# Patient Record
Sex: MALE | URBAN - METROPOLITAN AREA
[De-identification: names, ages, dates, MRNs, and addresses within clinical notes are randomized per-mention and may not be internally consistent; named-entity substitution may affect disease eponyms.]

---

## 2019-08-06 ENCOUNTER — OFFICE VISIT (OUTPATIENT)
Dept: URBAN - METROPOLITAN AREA CLINIC 11 | Facility: CLINIC | Age: 39
End: 2019-08-06
Payer: COMMERCIAL

## 2019-08-06 DIAGNOSIS — E11.9 TYPE 2 DIABETES MELLITUS W/O COMPLICATION: Primary | ICD-10-CM

## 2019-08-06 DIAGNOSIS — H16.223 KERATOCONJUNCTIVITIS SICCA, NOT SPECIFIED AS SJÖGREN'S, BILATERAL: ICD-10-CM

## 2019-08-06 PROCEDURE — 92004 COMPRE OPH EXAM NEW PT 1/>: CPT | Performed by: OPTOMETRIST

## 2019-08-06 PROCEDURE — 92133 CPTRZD OPH DX IMG PST SGM ON: CPT | Performed by: OPTOMETRIST

## 2019-08-06 RX ORDER — CYCLOSPORINE 0.5 MG/ML
0.05 % EMULSION OPHTHALMIC
Qty: 60 | Refills: 11 | Status: INACTIVE
Start: 2019-08-06 | End: 2019-08-12

## 2019-08-06 RX ORDER — LOTEPREDNOL ETABONATE 3.8 MG/G
0.38 % GEL OPHTHALMIC
Qty: 1 | Refills: 2 | Status: INACTIVE
Start: 2019-08-06 | End: 2021-01-25

## 2019-08-06 ASSESSMENT — INTRAOCULAR PRESSURE
OD: 18
OS: 18

## 2019-08-06 NOTE — IMPRESSION/PLAN
Impression: Type 2 diabetes mellitus w/o complication: G18.8. Plan: No signs of retinopathy or neovascularization noted. Discussed ocular and systemic benefits of blood sugar control.  RTC 1yr complete exam

## 2019-08-06 NOTE — IMPRESSION/PLAN
Impression: Open angle with borderline findings, high risk, bilateral: H40.023. Plan: OCT of RNFL ordered and performed today. RNFL mild thinning OS.  Monitor without drops for now. schedule VF 24-2.
f/u 1mn IOP VF

## 2019-09-18 ENCOUNTER — OFFICE VISIT (OUTPATIENT)
Dept: URBAN - METROPOLITAN AREA CLINIC 11 | Facility: CLINIC | Age: 39
End: 2019-09-18
Payer: COMMERCIAL

## 2019-09-18 DIAGNOSIS — H40.023 OPEN ANGLE WITH BORDERLINE FINDINGS, HIGH RISK, BILATERAL: Primary | ICD-10-CM

## 2019-09-18 PROCEDURE — 92012 INTRM OPH EXAM EST PATIENT: CPT | Performed by: OPTOMETRIST

## 2019-09-18 PROCEDURE — 92083 EXTENDED VISUAL FIELD XM: CPT | Performed by: OPTOMETRIST

## 2019-09-18 ASSESSMENT — INTRAOCULAR PRESSURE
OS: 22
OD: 20

## 2019-09-18 NOTE — IMPRESSION/PLAN
Impression: Open angle with borderline findings, high risk, bilateral: H40.023. OCT 8/19 9/9 94/87; VF 9/19 OD mild SA, OS Full Plan: VF ordered and performed today. VF shows defects OD. Repeat VF in 4-6mns. IOP slightly elevated today ou.  Monitor without drops for now. schedule VF 24-2.
f/u 4-6mns IOP VF

## 2021-01-25 ENCOUNTER — OFFICE VISIT (OUTPATIENT)
Dept: URBAN - METROPOLITAN AREA CLINIC 11 | Facility: CLINIC | Age: 41
End: 2021-01-25
Payer: COMMERCIAL

## 2021-01-25 DIAGNOSIS — H52.223 REGULAR ASTIGMATISM, BILATERAL: ICD-10-CM

## 2021-01-25 PROCEDURE — 92133 CPTRZD OPH DX IMG PST SGM ON: CPT | Performed by: OPTOMETRIST

## 2021-01-25 PROCEDURE — 99214 OFFICE O/P EST MOD 30 MIN: CPT | Performed by: OPTOMETRIST

## 2021-01-25 ASSESSMENT — INTRAOCULAR PRESSURE
OD: 19
OS: 19

## 2021-01-25 ASSESSMENT — VISUAL ACUITY
OS: 20/20
OD: 20/20

## 2021-01-25 NOTE — IMPRESSION/PLAN
Impression: Open angle with borderline findings, high risk, bilateral: H40.023. Plan: OCT of RNFL ordered and performed today. RNFL mild thinning OU. Monitor without drops for now.

## 2021-01-25 NOTE — IMPRESSION/PLAN
Impression: Type 2 diabetes mellitus w/o complication: X63.8. Plan: No signs of retinopathy or neovascularization noted. Discussed ocular and systemic benefits of blood sugar control.  RTC 1yr complete exam

## 2022-08-29 ENCOUNTER — OFFICE VISIT (OUTPATIENT)
Facility: LOCATION | Age: 42
End: 2022-08-29
Payer: COMMERCIAL

## 2022-08-29 DIAGNOSIS — E11.9 TYPE 2 DIABETES MELLITUS WITHOUT COMPLICATIONS: ICD-10-CM

## 2022-08-29 DIAGNOSIS — H40.023 OPEN ANGLE WITH BORDERLINE FINDINGS, HIGH RISK, BILATERAL: Primary | ICD-10-CM

## 2022-08-29 PROCEDURE — 99204 OFFICE O/P NEW MOD 45 MIN: CPT | Performed by: OPTOMETRIST

## 2022-08-29 PROCEDURE — 92134 CPTRZ OPH DX IMG PST SGM RTA: CPT | Performed by: OPTOMETRIST

## 2022-08-29 PROCEDURE — 92133 CPTRZD OPH DX IMG PST SGM ON: CPT | Performed by: OPTOMETRIST

## 2022-08-29 ASSESSMENT — VISUAL ACUITY
OD: 20/25
OS: 20/20

## 2022-08-29 ASSESSMENT — KERATOMETRY
OD: 42.63
OS: 42.13

## 2022-08-29 ASSESSMENT — INTRAOCULAR PRESSURE
OD: 18
OS: 18

## 2022-08-29 NOTE — IMPRESSION/PLAN
Impression: Open angle with borderline findings, high risk, bilateral: H40.023. Plan: Patient advised of stable condition and IOP stable without medication Will continue to monitor intraocular pressure. No treatment is being implemented at this time. 
RTC 1 year DFE , RNFL OU

## 2023-08-25 ENCOUNTER — OFFICE VISIT (OUTPATIENT)
Facility: LOCATION | Age: 43
End: 2023-08-25
Payer: COMMERCIAL

## 2023-08-25 DIAGNOSIS — H40.023 OPEN ANGLE WITH BORDERLINE FINDINGS, HIGH RISK, BILATERAL: ICD-10-CM

## 2023-08-25 DIAGNOSIS — H04.123 TEAR FILM INSUFFICIENCY OF BILATERAL LACRIMAL GLANDS: ICD-10-CM

## 2023-08-25 DIAGNOSIS — E11.9 TYPE 2 DIABETES MELLITUS WITHOUT COMPLICATIONS: Primary | ICD-10-CM

## 2023-08-25 PROCEDURE — 92133 CPTRZD OPH DX IMG PST SGM ON: CPT | Performed by: OPTOMETRIST

## 2023-08-25 PROCEDURE — 92014 COMPRE OPH EXAM EST PT 1/>: CPT | Performed by: OPTOMETRIST

## 2023-08-25 PROCEDURE — 92250 FUNDUS PHOTOGRAPHY W/I&R: CPT | Performed by: OPTOMETRIST

## 2023-08-25 ASSESSMENT — INTRAOCULAR PRESSURE
OS: 20
OD: 20

## 2023-08-25 ASSESSMENT — VISUAL ACUITY
OS: 20/20
OD: 20/20

## 2024-07-24 ENCOUNTER — OFFICE VISIT (OUTPATIENT)
Facility: LOCATION | Age: 44
End: 2024-07-24
Payer: COMMERCIAL

## 2024-07-24 DIAGNOSIS — E11.9 TYPE 2 DIABETES MELLITUS WITHOUT COMPLICATIONS: Primary | ICD-10-CM

## 2024-07-24 DIAGNOSIS — H04.123 TEAR FILM INSUFFICIENCY OF BILATERAL LACRIMAL GLANDS: ICD-10-CM

## 2024-07-24 DIAGNOSIS — H40.023 OPEN ANGLE WITH BORDERLINE FINDINGS, HIGH RISK, BILATERAL: ICD-10-CM

## 2024-07-24 PROCEDURE — 92133 CPTRZD OPH DX IMG PST SGM ON: CPT | Performed by: OPTOMETRIST

## 2024-07-24 PROCEDURE — 92014 COMPRE OPH EXAM EST PT 1/>: CPT | Performed by: OPTOMETRIST

## 2024-07-24 ASSESSMENT — INTRAOCULAR PRESSURE
OS: 19
OD: 18

## 2024-07-24 ASSESSMENT — VISUAL ACUITY
OD: 20/25
OS: 20/25